# Patient Record
Sex: FEMALE | Race: WHITE | Employment: FULL TIME | URBAN - METROPOLITAN AREA
[De-identification: names, ages, dates, MRNs, and addresses within clinical notes are randomized per-mention and may not be internally consistent; named-entity substitution may affect disease eponyms.]

---

## 2017-06-19 ENCOUNTER — OFFICE VISIT (OUTPATIENT)
Dept: DERMATOLOGY | Age: 47
End: 2017-06-19

## 2017-06-19 DIAGNOSIS — B07.8 OTHER VIRAL WARTS: Primary | ICD-10-CM

## 2017-06-19 DIAGNOSIS — D22.9 MULTIPLE NEVI: ICD-10-CM

## 2017-06-19 PROCEDURE — 99213 OFFICE O/P EST LOW 20 MIN: CPT | Performed by: DERMATOLOGY

## 2017-06-19 PROCEDURE — 17110 DESTRUCTION B9 LES UP TO 14: CPT | Performed by: DERMATOLOGY

## 2017-06-19 RX ORDER — MULTIVIT-MIN/IRON/FOLIC ACID/K 18-600-40
CAPSULE ORAL
COMMUNITY

## 2018-03-16 ENCOUNTER — OFFICE VISIT (OUTPATIENT)
Dept: PRIMARY CARE CLINIC | Age: 48
End: 2018-03-16

## 2018-03-16 VITALS
SYSTOLIC BLOOD PRESSURE: 128 MMHG | TEMPERATURE: 99.2 F | BODY MASS INDEX: 30.79 KG/M2 | WEIGHT: 173.8 LBS | DIASTOLIC BLOOD PRESSURE: 85 MMHG | HEART RATE: 72 BPM

## 2018-03-16 DIAGNOSIS — H10.31 ACUTE BACTERIAL CONJUNCTIVITIS OF RIGHT EYE: Primary | ICD-10-CM

## 2018-03-16 PROCEDURE — 99213 OFFICE O/P EST LOW 20 MIN: CPT | Performed by: NURSE PRACTITIONER

## 2018-03-16 RX ORDER — POLYMYXIN B SULFATE AND TRIMETHOPRIM 1; 10000 MG/ML; [USP'U]/ML
1 SOLUTION OPHTHALMIC EVERY 6 HOURS
Qty: 10 ML | Refills: 0 | Status: SHIPPED | OUTPATIENT
Start: 2018-03-16 | End: 2018-03-21

## 2018-03-16 ASSESSMENT — ENCOUNTER SYMPTOMS
SORE THROAT: 0
COUGH: 0
SHORTNESS OF BREATH: 0
CHEST TIGHTNESS: 0
SINUS PAIN: 0
EYE REDNESS: 1
EYE ITCHING: 0
EYE PAIN: 1
FACIAL SWELLING: 0
PHOTOPHOBIA: 0
SINUS PRESSURE: 0
EYE DISCHARGE: 1

## 2018-03-16 NOTE — PROGRESS NOTES
Patient here in clinic stating eye irritation and drainage to right eye started last night. States occasional thick crust last night and this morning. Denies any drainage to left eye at this time. Denies any congestion or other associated symptoms. Denies any difficulty with vision or photophobia. States \"it's just sore\" and it doesn't feel right. Denies any foreign body sensation. Denies any fever. States \"it feels raw\". States pain as 2/10. Has used saline drops this morning without much relief. Review of Systems   HENT: Negative for congestion, ear discharge, ear pain, facial swelling, postnasal drip, sinus pain, sinus pressure, sneezing and sore throat. Eyes: Positive for pain, discharge and redness. Negative for photophobia, itching and visual disturbance. Respiratory: Negative for cough, chest tightness and shortness of breath. Cardiovascular: Negative for chest pain. Allergic/Immunologic: Positive for environmental allergies. Neurological: Negative for dizziness, light-headedness and headaches. Past Medical History  No past medical history on file. Current Medications  Current Outpatient Prescriptions   Medication Sig Dispense Refill    trimethoprim-polymyxin b (POLYTRIM) 21055-5.1 UNIT/ML-% ophthalmic solution Place 1 drop into the right eye every 6 hours for 5 days 10 mL 0    Cholecalciferol (VITAMIN D) 2000 units CAPS capsule Take by mouth       No current facility-administered medications for this visit. Allergies  Allergies   Allergen Reactions    Erythromycin Nausea And Vomiting       Past Surgical History  No past surgical history on file. Past Social History  Social History     Social History    Marital status:      Spouse name: N/A    Number of children: N/A    Years of education: N/A     Occupational History    Not on file.      Social History Main Topics    Smoking status: Never Smoker    Smokeless tobacco: Never Used    Alcohol use Not on file

## 2018-06-21 ENCOUNTER — OFFICE VISIT (OUTPATIENT)
Dept: DERMATOLOGY | Age: 48
End: 2018-06-21

## 2018-06-21 DIAGNOSIS — D18.01 CHERRY ANGIOMA: ICD-10-CM

## 2018-06-21 DIAGNOSIS — D22.9 MULTIPLE NEVI: ICD-10-CM

## 2018-06-21 DIAGNOSIS — D22.39 FIBROUS PAPULE OF NOSE: Primary | ICD-10-CM

## 2018-06-21 PROCEDURE — 99213 OFFICE O/P EST LOW 20 MIN: CPT | Performed by: DERMATOLOGY

## 2019-05-03 DIAGNOSIS — H10.32 ACUTE CONJUNCTIVITIS OF LEFT EYE, UNSPECIFIED ACUTE CONJUNCTIVITIS TYPE: Primary | ICD-10-CM

## 2019-05-03 RX ORDER — POLYMYXIN B SULFATE AND TRIMETHOPRIM 1; 10000 MG/ML; [USP'U]/ML
1 SOLUTION OPHTHALMIC EVERY 4 HOURS
Qty: 10 ML | Refills: 0 | Status: SHIPPED | OUTPATIENT
Start: 2019-05-03 | End: 2019-05-13

## 2019-06-24 ENCOUNTER — OFFICE VISIT (OUTPATIENT)
Dept: DERMATOLOGY | Age: 49
End: 2019-06-24
Payer: COMMERCIAL

## 2019-06-24 DIAGNOSIS — D22.9 MULTIPLE NEVI: ICD-10-CM

## 2019-06-24 DIAGNOSIS — D48.5 NEOPLASM OF UNCERTAIN BEHAVIOR OF SKIN: Primary | ICD-10-CM

## 2019-06-24 PROCEDURE — 99213 OFFICE O/P EST LOW 20 MIN: CPT | Performed by: DERMATOLOGY

## 2019-06-24 PROCEDURE — 11102 TANGNTL BX SKIN SINGLE LES: CPT | Performed by: DERMATOLOGY

## 2019-06-24 RX ORDER — LORATADINE 10 MG/1
10 CAPSULE, LIQUID FILLED ORAL DAILY
COMMUNITY
Start: 2012-05-21 | End: 2022-05-04

## 2019-06-24 NOTE — PATIENT INSTRUCTIONS

## 2019-06-24 NOTE — PROGRESS NOTES
[facility]  Aleida Noyola MD  [phone]      Edmund Rizo  1970    52 y.o. female     Date of Visit: 6/24/2019    Chief Complaint: skin lesion, moles    History of Present Illness:    1. She complains of a persistent lesion on the right thigh. 2.  She has multiple nevi on the trunk and extremities - not aware of any changes in size, color or shape. Review of Systems:  Skin: no rash. Past Medical History, Family History, Surgical History, Medications and Allergies reviewed. History reviewed. No pertinent past medical history. History reviewed. No pertinent surgical history. Allergies   Allergen Reactions    Erythromycin Nausea And Vomiting     Outpatient Medications Marked as Taking for the 6/24/19 encounter (Office Visit) with Shannon Patel MD   Medication Sig Dispense Refill    loratadine (CLARITIN) 10 MG capsule Take 10 mg by mouth daily      Cholecalciferol (VITAMIN D) 2000 units CAPS capsule Take by mouth           Physical Examination       The following were examined and determined to be normal: Psych/Neuro, Scalp/hair, Head/face, Conjunctivae/eyelids, Gums/teeth/lips, Neck, Breast/axilla/chest, Abdomen, Back, RUE, LUE, LLE and Nails/digits. The following were examined and determined to be abnormal: RLE. Well-appearing. 1.  Right anterior thigh with a 3 mm round essentially crusted indurated pink-brown papule. 2.  Scattered on the trunk and extremities are multiple well-defined round oval uniformly brown macules and few papules. Assessment and Plan     1. Neoplasm of uncertain behavior of skin, right thigh - DF vs less likely prurigo nodule    Discussed possible diagnosis; patient agreeable to biopsy (verbal consent obtained). The area(s) to be biopsied were marked with a surgical pen. Alcohol was used to cleanse the site. Local anesthesia was acheived with 1% lidocaine with epinephrine.  Shave biopsy was performed using a razor blade. Hemostasis was achieved with aluminum chloride. The wound(s) were dressed with petrolatum and covered with a bandage. Wound care instructions were reviewed. 1 Specimen (s) sent to pathology. The specimen bottles were appropriately labeled. We also reviewed the risks of bleeding, scar, and infection. 2. Multiple nevi - benign appearing    Sun protective behaviors and self skin examinations were encouraged. Call for any new or concerning lesions. Return in about 1 year (around 6/24/2020).

## 2019-06-26 LAB — DERMATOLOGY PATHOLOGY REPORT: NORMAL

## 2020-06-17 ENCOUNTER — OFFICE VISIT (OUTPATIENT)
Dept: DERMATOLOGY | Age: 50
End: 2020-06-17
Payer: COMMERCIAL

## 2020-06-17 PROCEDURE — 99213 OFFICE O/P EST LOW 20 MIN: CPT | Performed by: DERMATOLOGY

## 2020-06-17 RX ORDER — NABUMETONE 750 MG/1
750 TABLET, FILM COATED ORAL 2 TIMES DAILY
COMMUNITY
Start: 2020-04-07 | End: 2022-05-04

## 2020-06-17 RX ORDER — CALCIUM CARBONATE 500(1250)
500 TABLET ORAL DAILY
COMMUNITY

## 2020-06-17 RX ORDER — TRIAMTERENE AND HYDROCHLOROTHIAZIDE 75; 50 MG/1; MG/1
1 TABLET ORAL DAILY
COMMUNITY
Start: 2020-04-07

## 2022-05-04 ENCOUNTER — OFFICE VISIT (OUTPATIENT)
Dept: DERMATOLOGY | Age: 52
End: 2022-05-04
Payer: COMMERCIAL

## 2022-05-04 VITALS — TEMPERATURE: 97.6 F

## 2022-05-04 DIAGNOSIS — D22.9 MULTIPLE NEVI: Primary | ICD-10-CM

## 2022-05-04 DIAGNOSIS — B07.9 VERRUCA VULGARIS: ICD-10-CM

## 2022-05-04 PROCEDURE — 17110 DESTRUCTION B9 LES UP TO 14: CPT | Performed by: DERMATOLOGY

## 2022-05-04 PROCEDURE — 99212 OFFICE O/P EST SF 10 MIN: CPT | Performed by: DERMATOLOGY

## 2022-05-04 NOTE — PROGRESS NOTES
[facility]  Sangita Willoughby MD  [phone]      Janina Heath  1970    46 y.o. female     Date of Visit: 5/4/2022    Chief Complaint: skin lesions    History of Present Illness:    1. She presents today for evaluation of multiple moles on the trunk and extremities-not aware of any changes in size, color, or shape. 2.  She also complains of multiple warts on the right hand. Review of Systems:  Gen: Feels well, good sense of health. Past Medical History, Family History, Surgical History, Medications and Allergies reviewed. History reviewed. No pertinent past medical history. History reviewed. No pertinent surgical history. Allergies   Allergen Reactions    Erythromycin Nausea And Vomiting     Outpatient Medications Marked as Taking for the 5/4/22 encounter (Office Visit) with Cedric Bender MD   Medication Sig Dispense Refill    calcium carbonate (OSCAL) 500 MG TABS tablet Take 500 mg by mouth daily      triamterene-hydrochlorothiazide (MAXZIDE) 75-50 MG per tablet Take 1 tablet by mouth daily      Cholecalciferol (VITAMIN D) 2000 units CAPS capsule Take by mouth             Physical Examination       The following were examined and determined to be normal: Psych/Neuro, Scalp/hair, Head/face, Conjunctivae/eyelids, Gums/teeth/lips, Neck, Breast/axilla/chest, Abdomen, Back, LUE, RLE, LLE and Nails/digits. The following were examined and determined to be abnormal: RUE. Well appearing. 1.  Trunk and extremities with multiple well defined round to oval smooth brown macules and papules. 2.  Dorsum of the right hand (4) and right index finger (1) - 5 verrucous pink papules. Assessment and Plan     1. Multiple nevi - benign appearing    Sun protective behaviors, including use of at least SPF 30 sunscreen, and self skin examinations were encouraged. Call for any new or concerning lesions.        2. Verruca vulgaris     2 cycles of liquid nitrogen applied to 5 warts on the right hand and index finger. Patient was educated regarding the potential risks of blister formation and discomfort. Wound care was discussed. Return in about 1 year (around 5/4/2023).     --Mayra Lopez MD

## 2022-06-30 ENCOUNTER — PATIENT MESSAGE (OUTPATIENT)
Dept: DERMATOLOGY | Age: 52
End: 2022-06-30

## 2022-06-30 NOTE — TELEPHONE ENCOUNTER
Called and spoke to patient and scheduled her for 8/4/22 @ 1:30pm.   Advised her that I would call her with any sooner cancellations. Patient verbalized understanding.

## 2022-07-05 NOTE — TELEPHONE ENCOUNTER
Called and spoke to patient and offered her opening for this morning. She was unable to make it due to being out of town. Will watch for another cancellation for her. She verbalized understanding.

## 2022-07-12 ENCOUNTER — OFFICE VISIT (OUTPATIENT)
Dept: DERMATOLOGY | Age: 52
End: 2022-07-12
Payer: COMMERCIAL

## 2022-07-12 DIAGNOSIS — B07.9 VERRUCA VULGARIS: Primary | ICD-10-CM

## 2022-07-12 PROCEDURE — 17110 DESTRUCTION B9 LES UP TO 14: CPT | Performed by: DERMATOLOGY

## 2022-07-12 RX ORDER — MULTIVIT-MIN/FERROUS GLUCONATE 9 MG/15 ML
15 LIQUID (ML) ORAL DAILY
COMMUNITY

## 2022-07-12 NOTE — PROGRESS NOTES
[facility]  Rica Brown MD  [phone]      Avelina Sigala  1970    46 y.o. female     Date of Visit: 7/12/2022    Chief Complaint: warts    History of Present Illness:    Here today for recurring/spreading warts on the right hand and index finger. Review of Systems:  Gen: Feels well, good sense of health. Past Medical History, Family History, Surgical History, Medications and Allergies reviewed. History reviewed. No pertinent past medical history. History reviewed. No pertinent surgical history. Allergies   Allergen Reactions    Erythromycin Nausea And Vomiting     Outpatient Medications Marked as Taking for the 7/12/22 encounter (Office Visit) with Tali Garcia MD   Medication Sig Dispense Refill    Ferrous Sulfate (IRON PO) Take by mouth      Multiple Vitamins-Minerals (CENTRUM/CERTA-GILLIAN WITH MINERALS ORAL) solution Take 15 mLs by mouth daily      COLLAGEN PO Take by mouth      calcium carbonate (OSCAL) 500 MG TABS tablet Take 500 mg by mouth daily      triamterene-hydrochlorothiazide (MAXZIDE) 75-50 MG per tablet Take 1 tablet by mouth daily      Cholecalciferol (VITAMIN D) 2000 units CAPS capsule Take by mouth           Physical Examination       Well appearing. 1.  Dorsal/medial surface of the right index finger - 3, dorsum of the right hand - 6 : variably sized verrucous pink and skin colored papules. Assessment and Plan     1. Verruca vulgaris - 9     Cryotherapy was discussed and patient agreed to proceed. Consent was obtained. 9 lesions were treated cryotherapy: Dorsal/medial surface of the right index finger - 3, dorsum of the right hand - 6. 2 cycles of liquid nitrogen applied to each lesion for 5 seconds using a Cry-Ac cryo spray gun. Patient was educated regarding the potential risks of blister formation and discomfort. Wound care was discussed.   The patient tolerated the procedure well and there were no immediate complications. Return in about 4 weeks (around 8/9/2022).     --Stacy Brink MD

## 2022-08-04 ENCOUNTER — OFFICE VISIT (OUTPATIENT)
Dept: DERMATOLOGY | Age: 52
End: 2022-08-04
Payer: COMMERCIAL

## 2022-08-04 DIAGNOSIS — D23.71 DERMATOFIBROMA OF RIGHT THIGH: ICD-10-CM

## 2022-08-04 DIAGNOSIS — B07.9 VERRUCA VULGARIS: Primary | ICD-10-CM

## 2022-08-04 PROCEDURE — 17110 DESTRUCTION B9 LES UP TO 14: CPT | Performed by: DERMATOLOGY

## 2022-08-04 PROCEDURE — 99212 OFFICE O/P EST SF 10 MIN: CPT | Performed by: DERMATOLOGY

## 2022-08-04 NOTE — PATIENT INSTRUCTIONS
The bump on your right thigh is a benign dermatofibroma    Cryosurgery (Freezing) Wound Care Instructions    AFTER THE PROCEDURE:   You will notice swelling and redness around the site. This is normal.   You may experience a sharp or sore feeling for the next several days. For this discomfort, you may take acetaminophen (Tylenol©). A blister may develop at the treated area, sometimes as soon as by the end of the day. After several days, the blister will subside and a scab will form. If the area is bumped or traumatized during the first few days following freezing, you may develop bleeding into the blister, forming a blood blister. This is nothing to be alarmed about. If the blister is tense, uncomfortable, or much larger than the site that was frozen, you may pop the blister along its edge with a sterile needle (boiled, heated under a flame, or cleaned with alcohol) to allow the fluid to drain out. If the blister does not bother you, no treatment is needed. Do NOT peel off the top of the blister roof. It will act as a dressing on top of your wound. WOUND CARE:   You may shower or bathe as usual, but avoid scrubbing the areas that have been frozen. Cleanse the site twice a day with mild soapy water, and then apply a thin film of white petrolatum (Vaseline©). You do not need to cover the area, but can if you prefer. Do NOT allow the site to become dry or crusted, or attempt to dry it out with rubbing alcohol or hydrogen peroxide. Continue this regimen until the area is pink and healed. Depending on the size and location of your cryosurgery site, healing may take 2 to 4 weeks. The area may continue to be pink for several weeks, and over the next few months may become darker or lighter than the surrounding skin. This may be a permanent change.

## 2022-08-04 NOTE — PROGRESS NOTES
[facility]  Marcial Blancas MD  [phone]      Freddy Patel  1970    46 y.o. female     Date of Visit: 8/4/2022    Chief Complaint: wart    History of Present Illness:    She returns today to follow-up for multiple warts on the right hand. She had some improvement after last visit but has lesions that have recurred. 2.  She also reports a persistent asymptomatic lesion on the right thigh - present for the past year. Review of Systems:  Gen: Feels well, good sense of health. Past Medical History, Family History, Surgical History, Medications and Allergies reviewed. No past medical history on file. No past surgical history on file. Allergies   Allergen Reactions    Erythromycin Nausea And Vomiting     Outpatient Medications Marked as Taking for the 8/4/22 encounter (Office Visit) with Crys Forte MD   Medication Sig Dispense Refill    Ferrous Sulfate (IRON PO) Take by mouth      Multiple Vitamins-Minerals (CENTRUM/CERTA-GILLIAN WITH MINERALS ORAL) solution Take 15 mLs by mouth daily      COLLAGEN PO Take by mouth      calcium carbonate (OSCAL) 500 MG TABS tablet Take 500 mg by mouth daily      triamterene-hydrochlorothiazide (MAXZIDE) 75-50 MG per tablet Take 1 tablet by mouth daily      Cholecalciferol (VITAMIN D) 2000 units CAPS capsule Take by mouth           Physical Examination       Well appearing. Dorsum of the right hand - 5, right index finger - 2: several round verrucous pink papules. 2.  Anterior portion of the right thigh with an indurated pink brown papule. Assessment and Plan     1. Verruca vulgaris - 7    Cryotherapy was discussed and patient agreed to proceed. Consent was obtained. 7 lesions were treated cryotherapy: Dorsum of the right hand - 5, right index finger - 2. 2 cycles of liquid nitrogen applied to each lesion for 5 seconds using a Cry-Ac cryo spray gun.   Patient was educated regarding the potential risks of blister formation and discomfort. Wound care was discussed. The patient tolerated the procedure well and there were no immediate complications. 2. Dermatofibroma of right thigh     Reassurance.             --Jesus Mclain MD

## 2022-08-31 ENCOUNTER — OFFICE VISIT (OUTPATIENT)
Dept: DERMATOLOGY | Age: 52
End: 2022-08-31
Payer: COMMERCIAL

## 2022-08-31 DIAGNOSIS — B07.9 VERRUCA VULGARIS: Primary | ICD-10-CM

## 2022-08-31 PROCEDURE — 17110 DESTRUCTION B9 LES UP TO 14: CPT | Performed by: DERMATOLOGY

## 2022-08-31 NOTE — PROGRESS NOTES
[facility]  Jasmina Davis MD  [phone]      Leslie Olvera  1970    46 y.o. female     Date of Visit: 8/31/2022    Chief Complaint: warts    History of Present Illness:    She returns today to follow up for spreading warts on the right hand. Has improved with cryotherapy. Has few remaining lesions. Review of Systems:  Gen: Feels well, good sense of health. Past Medical History, Family History, Surgical History, Medications and Allergies reviewed. History reviewed. No pertinent past medical history. No past surgical history on file. Allergies   Allergen Reactions    Erythromycin Nausea And Vomiting     Outpatient Medications Marked as Taking for the 8/31/22 encounter (Office Visit) with Juan Valenzuela MD   Medication Sig Dispense Refill    Ferrous Sulfate (IRON PO) Take by mouth      Multiple Vitamins-Minerals (CENTRUM/CERTA-GILLIAN WITH MINERALS ORAL) solution Take 15 mLs by mouth daily      COLLAGEN PO Take by mouth      calcium carbonate (OSCAL) 500 MG TABS tablet Take 500 mg by mouth daily      triamterene-hydrochlorothiazide (MAXZIDE) 75-50 MG per tablet Take 1 tablet by mouth daily      Cholecalciferol (VITAMIN D) 2000 units CAPS capsule Take by mouth           Physical Examination       Well appearing. 1.  Right index - 1, dorsum of the right hand - 3: 4 verrucous pink papules. Assessment and Plan     1. Verruca vulgaris - 4, improving (had 7 at last visit)    Cryotherapy was discussed and patient agreed to proceed. Consent was obtained. 4 lesions were treated cryotherapy after soaking in tap water: Right index - 1, dorsum of the right hand - 3. 2 cycles of liquid nitrogen applied to each lesion for 5 seconds using a [employer]-Ac cryo spray gun. Patient was educated regarding the potential risks of blister formation and discomfort. Wound care was discussed. The patient tolerated the procedure well and there were no immediate complications. --Annmarie Berrios MD

## 2022-09-28 ENCOUNTER — OFFICE VISIT (OUTPATIENT)
Dept: DERMATOLOGY | Age: 52
End: 2022-09-28
Payer: COMMERCIAL

## 2022-09-28 DIAGNOSIS — B07.9 VERRUCA VULGARIS: Primary | ICD-10-CM

## 2022-09-28 DIAGNOSIS — R20.9 DISTURBANCE OF SKIN SENSATION: ICD-10-CM

## 2022-09-28 PROCEDURE — 17110 DESTRUCTION B9 LES UP TO 14: CPT | Performed by: DERMATOLOGY

## 2022-09-28 NOTE — PROGRESS NOTES
[facility]  Lars Cuellar MD  [phone]      Polo Lips  1970    46 y.o. female     Date of Visit: 9/28/2022    Chief Complaint: warts    History of Present Illness:    Here today to follow up for persistent warts on the right hand. Had a decrease in number with cryotherapy but several lesions persist and has developed 1 new lesion on the right index finger. Review of Systems:  Gen: Feels well, good sense of health. Past Medical History, Family History, Surgical History, Medications and Allergies reviewed. History reviewed. No pertinent past medical history. History reviewed. No pertinent surgical history. Allergies   Allergen Reactions    Erythromycin Nausea And Vomiting     Outpatient Medications Marked as Taking for the 9/28/22 encounter (Office Visit) with June Pool MD   Medication Sig Dispense Refill    Ferrous Sulfate (IRON PO) Take by mouth      Multiple Vitamins-Minerals (CENTRUM/CERTA-GILLIAN WITH MINERALS ORAL) solution Take 15 mLs by mouth daily      COLLAGEN PO Take by mouth      calcium carbonate (OSCAL) 500 MG TABS tablet Take 500 mg by mouth daily      triamterene-hydrochlorothiazide (MAXZIDE) 75-50 MG per tablet Take 1 tablet by mouth daily      Cholecalciferol (VITAMIN D) 2000 units CAPS capsule Take by mouth           Physical Examination       Well appearing. R index finger - 3, dorsum of the right hand - 3: several verrucous pink papules. Assessment and Plan     1. Verruca vulgaris - 6; 1 new, several persistent    Cryotherapy was discussed and patient agreed to proceed. Consent was obtained. 6 lesions were treated cryotherapy: R index finger - 3, dorsum of the right hand - 3. 2 cycles of liquid nitrogen applied to each lesion for 5 seconds using a [employer]-Ac cryo spray gun. Patient was educated regarding the potential risks of blister formation and discomfort. Wound care was discussed.   The patient tolerated the procedure well

## 2022-10-19 ENCOUNTER — OFFICE VISIT (OUTPATIENT)
Dept: DERMATOLOGY | Age: 52
End: 2022-10-19
Payer: COMMERCIAL

## 2022-10-19 DIAGNOSIS — B07.9 VERRUCA VULGARIS: Primary | ICD-10-CM

## 2022-10-19 DIAGNOSIS — R20.9 DISTURBANCE OF SKIN SENSATION: ICD-10-CM

## 2022-10-19 PROCEDURE — 17110 DESTRUCTION B9 LES UP TO 14: CPT | Performed by: DERMATOLOGY

## 2022-10-19 NOTE — PROGRESS NOTES
[facility]  Charity Finney MD  [phone]      Bertrand Oropeza  1970    46 y.o. female     Date of Visit: 10/19/2022    Chief Complaint: warts    History of Present Illness:    She returns today to follow up for multiple painful warts on the right hand and fingers. She reports improvement after treatment with cryotherapy last visit. Review of Systems:  Gen: Feels well, good sense of health. Past Medical History, Family History, Surgical History, Medications and Allergies reviewed. History reviewed. No pertinent past medical history. No past surgical history on file. Allergies   Allergen Reactions    Erythromycin Nausea And Vomiting     Outpatient Medications Marked as Taking for the 10/19/22 encounter (Office Visit) with Jonathan Abernathy MD   Medication Sig Dispense Refill    Ferrous Sulfate (IRON PO) Take by mouth      Multiple Vitamins-Minerals (CENTRUM/CERTA-GILLIAN WITH MINERALS ORAL) solution Take 15 mLs by mouth daily      COLLAGEN PO Take by mouth      calcium carbonate (OSCAL) 500 MG TABS tablet Take 500 mg by mouth daily      triamterene-hydrochlorothiazide (MAXZIDE) 75-50 MG per tablet Take 1 tablet by mouth daily      Cholecalciferol (VITAMIN D) 2000 units CAPS capsule Take by mouth             Physical Examination       Well appearing. 1.  Right mid index finger - 3 small verrucous papules with black dots. Remainder of hand and fingers clear. Assessment and Plan     1. Painful verruca vulgaris - improving with cryotherapy     Cryotherapy was discussed and patient agreed to proceed. Consent was obtained. 3 lesions were treated cryotherapy after soaking in tap water: right index finger. 2 cycles of liquid nitrogen applied to each lesion for 5 seconds using a Cry-Ac cryo spray gun. Patient was educated regarding the potential risks of blister formation and discomfort. Wound care was discussed.   The patient tolerated the procedure well and there were no immediate complications.           --Shelia Parks MD

## 2023-01-19 ENCOUNTER — TELEPHONE (OUTPATIENT)
Dept: DERMATOLOGY | Age: 53
End: 2023-01-19

## 2023-01-19 NOTE — TELEPHONE ENCOUNTER
Pt calling. She has warts on her hand that need to be taken off. An appt was made for 3/8/23. She would like to be worked I sooner if possible. Her phone number is [phone].

## 2023-02-02 ENCOUNTER — OFFICE VISIT (OUTPATIENT)
Dept: DERMATOLOGY | Age: 53
End: 2023-02-02
Payer: COMMERCIAL

## 2023-02-02 DIAGNOSIS — R20.9 DISTURBANCE OF SKIN SENSATION: ICD-10-CM

## 2023-02-02 DIAGNOSIS — L84 CORN OF FOOT: ICD-10-CM

## 2023-02-02 DIAGNOSIS — B07.9 VERRUCA VULGARIS: Primary | ICD-10-CM

## 2023-02-02 PROCEDURE — 11055 PARING/CUTG B9 HYPRKER LES 1: CPT | Performed by: DERMATOLOGY

## 2023-02-02 PROCEDURE — 17110 DESTRUCTION B9 LES UP TO 14: CPT | Performed by: DERMATOLOGY

## 2023-02-02 PROCEDURE — 99999 PR OFFICE/OUTPT VISIT,PROCEDURE ONLY: CPT | Performed by: DERMATOLOGY

## 2023-02-02 RX ORDER — FLUTICASONE PROPIONATE 50 MCG
1 SPRAY, SUSPENSION (ML) NASAL DAILY
COMMUNITY

## 2023-02-02 RX ORDER — CETIRIZINE HYDROCHLORIDE 10 MG/1
10 TABLET ORAL DAILY
COMMUNITY

## 2023-02-02 RX ORDER — LOSARTAN POTASSIUM 50 MG/1
50 TABLET ORAL DAILY
COMMUNITY
Start: 2022-12-20 | End: 2023-06-18

## 2023-02-02 RX ORDER — HYDROCHLOROTHIAZIDE 25 MG/1
25 TABLET ORAL DAILY
COMMUNITY
Start: 2022-12-20

## 2023-02-02 RX ORDER — ALBUTEROL SULFATE 90 UG/1
2 AEROSOL, METERED RESPIRATORY (INHALATION) EVERY 4 HOURS PRN
COMMUNITY
Start: 2021-11-29

## 2023-02-02 NOTE — PROGRESS NOTES
[facility]  Beverly Pablo MD  [phone]      Izabella May  1970    46 y.o. female     Date of Visit: 2/2/2023    Chief Complaint: skin lesions    History of Present Illness:    1. She presents today for a newly noted tender wart on the right index finger. 2.  She also complains of a painful lesion on the left foot. Review of Systems:  Gen: Feels well, good sense of health. Past Medical History, Family History, Surgical History, Medications and Allergies reviewed. History reviewed. No pertinent past medical history. History reviewed. No pertinent surgical history. Allergies   Allergen Reactions    Erythromycin Nausea And Vomiting     Outpatient Medications Marked as Taking for the 2/2/23 encounter (Office Visit) with Alexandra Mcleod MD   Medication Sig Dispense Refill    hydroCHLOROthiazide (HYDRODIURIL) 25 MG tablet Take 25 mg by mouth daily      losartan (COZAAR) 50 MG tablet Take 50 mg by mouth daily      cetirizine (ZYRTEC) 10 MG tablet Take 10 mg by mouth daily      albuterol sulfate HFA (PROVENTIL;VENTOLIN;PROAIR) 108 (90 Base) MCG/ACT inhaler Inhale 2 puffs into the lungs every 4 hours as needed      fluticasone (FLONASE) 50 MCG/ACT nasal spray 1 spray by Nasal route daily      Multiple Vitamins-Minerals (CENTRUM/CERTA-GILLIAN WITH MINERALS ORAL) solution Take 15 mLs by mouth daily      COLLAGEN PO Take by mouth      calcium carbonate (OSCAL) 500 MG TABS tablet Take 500 mg by mouth daily      Cholecalciferol (VITAMIN D) 2000 units CAPS capsule Take by mouth           Physical Examination       Well appearing. 1.  Proximal medial aspect of the right index finger - small verrucous pink papule. 2.  Left mid lateral foot along the plantar surface is a small yellowish papule with central keratotic core. Assessment and Plan     1. Painful verruca vulgaris of the right index finger -     Cryotherapy was discussed and patient agreed to proceed.   Consent was obtained. 1 lesions were treated cryotherapy after soaking in tap water: right index finger. 2 cycles of liquid nitrogen applied to each lesion for 5 seconds using a [employer]-Ac cryo spray gun. Patient was educated regarding the potential risks of blister formation and discomfort. Wound care was discussed. The patient tolerated the procedure well and there were no immediate complications. 3. Painful corn of the left foot     The lesion was cleansed with alcohol and pared down with a curette. No bleeding occurred and there were no complications.             --Mickey Milan MD

## 2023-06-05 ENCOUNTER — OFFICE VISIT (OUTPATIENT)
Dept: DERMATOLOGY | Age: 53
End: 2023-06-05
Payer: COMMERCIAL

## 2023-06-05 DIAGNOSIS — L91.8 SKIN TAG: ICD-10-CM

## 2023-06-05 DIAGNOSIS — D22.9 MULTIPLE NEVI: Primary | ICD-10-CM

## 2023-06-05 DIAGNOSIS — D23.71 DERMATOFIBROMA OF RIGHT THIGH: ICD-10-CM

## 2023-06-05 DIAGNOSIS — L29.9 PRURITUS: ICD-10-CM

## 2023-06-05 PROCEDURE — 99213 OFFICE O/P EST LOW 20 MIN: CPT | Performed by: DERMATOLOGY

## 2023-06-05 NOTE — PROGRESS NOTES
[facility]  Neeru Shipman MD  [phone]      Tatyana Guillen  1970    48 y.o. female     Date of Visit: 6/5/2023    Chief Complaint: skin moles    History of Present Illness:    1. She presents today for evaluation of multiple moles on the trunk and extremities-not aware of any changes in size, color, or shape. 2.  She has a stable asymptomatic lesion on the right thigh. 3.  She complains of few growths in the left axilla. 4.  She also reports a 6-month history of a recurrently pruritic area on the right shin without any rash. Review of Systems:  Gen: Feels well, good sense of health. Past Medical History, Family History, Surgical History, Medications and Allergies reviewed. History reviewed. No pertinent past medical history. History reviewed. No pertinent surgical history.     Allergies   Allergen Reactions    Erythromycin Nausea And Vomiting     Outpatient Medications Marked as Taking for the 6/5/23 encounter (Office Visit) with Debbie Villatoro MD   Medication Sig Dispense Refill    hydroCHLOROthiazide (HYDRODIURIL) 25 MG tablet Take 1 tablet by mouth daily      losartan (COZAAR) 50 MG tablet Take 1 tablet by mouth daily      cetirizine (ZYRTEC) 10 MG tablet Take 1 tablet by mouth daily      albuterol sulfate HFA (PROVENTIL;VENTOLIN;PROAIR) 108 (90 Base) MCG/ACT inhaler Inhale 2 puffs into the lungs every 4 hours as needed      fluticasone (FLONASE) 50 MCG/ACT nasal spray 1 spray by Nasal route daily      Multiple Vitamins-Minerals (CENTRUM/CERTA-GILLIAN WITH MINERALS ORAL) solution Take 15 mLs by mouth daily      COLLAGEN PO Take by mouth      calcium carbonate (OSCAL) 500 MG TABS tablet Take 1 tablet by mouth daily      Cholecalciferol (VITAMIN D) 2000 units CAPS capsule Take by mouth           Physical Examination       The following were examined and determined to be normal: Psych/Neuro, Scalp/hair, Head/face, Conjunctivae/eyelids, Gums/teeth/lips, Neck,

## 2024-06-06 ENCOUNTER — OFFICE VISIT (OUTPATIENT)
Dept: DERMATOLOGY | Age: 54
End: 2024-06-06
Payer: COMMERCIAL

## 2024-06-06 DIAGNOSIS — L81.4 SOLAR LENTIGO: ICD-10-CM

## 2024-06-06 DIAGNOSIS — L24.9 IRRITANT HAND DERMATITIS: ICD-10-CM

## 2024-06-06 DIAGNOSIS — D22.9 MULTIPLE NEVI: Primary | ICD-10-CM

## 2024-06-06 PROCEDURE — 99213 OFFICE O/P EST LOW 20 MIN: CPT | Performed by: DERMATOLOGY

## 2024-06-06 RX ORDER — LORATADINE 10 MG/1
10 CAPSULE, LIQUID FILLED ORAL DAILY
COMMUNITY

## 2024-06-06 RX ORDER — DICLOFENAC SODIUM 75 MG/1
75 TABLET, DELAYED RELEASE ORAL 2 TIMES DAILY
COMMUNITY

## 2024-06-06 NOTE — PROGRESS NOTES
small smooth tan macules.     3.  Right index finger and left 3rd webspace - 2 scaly pink patches.        Assessment and Plan     1. Multiple nevi - benign appearing    Sun protective behaviors, including use of at least SPF 30 sunscreen, and self skin examinations were encouraged.  Call for any new or concerning lesions.       2. Solar lentigines    Monitor for change.  Sun protective behaviors encouraged including use of at least SPF 30 or more sunscreen.      3. Irritant hand dermatitis - mild    Aquaphor or Vaseline 1-2 times daily until improved.         Return in about 1 year (around 6/6/2025).    --Nilay Liu MD

## 2025-06-04 ENCOUNTER — OFFICE VISIT (OUTPATIENT)
Age: 55
End: 2025-06-04
Payer: COMMERCIAL

## 2025-06-04 DIAGNOSIS — L29.9 PRURITUS: ICD-10-CM

## 2025-06-04 DIAGNOSIS — D22.9 MULTIPLE NEVI: Primary | ICD-10-CM

## 2025-06-04 PROCEDURE — 99213 OFFICE O/P EST LOW 20 MIN: CPT | Performed by: DERMATOLOGY

## 2025-06-04 RX ORDER — ESTRADIOL 0.1 MG/G
1 CREAM VAGINAL
COMMUNITY
Start: 2025-03-26

## 2025-06-04 NOTE — PROGRESS NOTES
[facility]  Nilay Liu MD  [phone]      Court Funk  1970    55 y.o. female     Date of Visit: 6/4/2025    Chief Complaint: Evaluation of moles    History of Present Illness:    1.  She presents today for evaluation of multiple moles on the trunk and extremities-not aware of any changes in size, color, shape.    2.  She also reports chronic/intermittent itching on the plantar surface of the right foot.  She reports no improvement with topical antifungals used over the past several months.      Review of Systems:  Gen: Feels well, good sense of health.    Past Medical History, Family History, Surgical History, Medications and Allergies reviewed.    History reviewed. No pertinent past medical history.  History reviewed. No pertinent surgical history.    Allergies   Allergen Reactions    Erythromycin Nausea And Vomiting     Outpatient Medications Marked as Taking for the 6/4/25 encounter (Office Visit) with Nilay Liu MD   Medication Sig Dispense Refill    estradiol (ESTRACE) 0.1 MG/GM vaginal cream Place 1 g vaginally      loratadine (CLARITIN) 10 MG capsule Take 1 capsule by mouth daily      diclofenac (VOLTAREN) 75 MG EC tablet Take 1 tablet by mouth 2 times daily      hydroCHLOROthiazide (HYDRODIURIL) 25 MG tablet Take 1 tablet by mouth daily      losartan (COZAAR) 50 MG tablet Take 1 tablet by mouth daily      albuterol sulfate HFA (PROVENTIL;VENTOLIN;PROAIR) 108 (90 Base) MCG/ACT inhaler Inhale 2 puffs into the lungs every 4 hours as needed      fluticasone (FLONASE) 50 MCG/ACT nasal spray 1 spray by Nasal route daily      Multiple Vitamins-Minerals (CENTRUM/CERTA-GILLIAN WITH MINERALS ORAL) solution Take 15 mLs by mouth daily      calcium carbonate (OSCAL) 500 MG TABS tablet Take 1 tablet by mouth daily      Cholecalciferol (VITAMIN D) 2000 units CAPS capsule Take by mouth             Physical Examination       Well appearing.    1.  Trunk and extremities with multiple